# Patient Record
Sex: MALE | Race: OTHER | HISPANIC OR LATINO | URBAN - METROPOLITAN AREA
[De-identification: names, ages, dates, MRNs, and addresses within clinical notes are randomized per-mention and may not be internally consistent; named-entity substitution may affect disease eponyms.]

---

## 2018-12-27 ENCOUNTER — EMERGENCY (EMERGENCY)
Facility: HOSPITAL | Age: 38
LOS: 1 days | Discharge: HOME | End: 2018-12-27
Admitting: EMERGENCY MEDICINE
Payer: COMMERCIAL

## 2018-12-27 VITALS
OXYGEN SATURATION: 98 % | SYSTOLIC BLOOD PRESSURE: 144 MMHG | DIASTOLIC BLOOD PRESSURE: 106 MMHG | RESPIRATION RATE: 16 BRPM | WEIGHT: 195.11 LBS | TEMPERATURE: 98 F | HEART RATE: 84 BPM
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
## 2018-12-27 DIAGNOSIS — S46.112A STRAIN OF MUSCLE, FASCIA AND TENDON OF LONG HEAD OF BICEPS, LEFT ARM, INITIAL ENCOUNTER: ICD-10-CM

## 2018-12-27 DIAGNOSIS — M79.602 PAIN IN LEFT ARM: ICD-10-CM
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
## 2018-12-27 PROCEDURE — 73060 X-RAY EXAM OF HUMERUS: CPT | Mod: 26,LT

## 2018-12-27 PROCEDURE — 73060 X-RAY EXAM OF HUMERUS: CPT

## 2018-12-27 PROCEDURE — 99283 EMERGENCY DEPT VISIT LOW MDM: CPT

## 2018-12-27 PROCEDURE — 73030 X-RAY EXAM OF SHOULDER: CPT

## 2018-12-27 PROCEDURE — 99284 EMERGENCY DEPT VISIT MOD MDM: CPT

## 2018-12-27 PROCEDURE — 73030 X-RAY EXAM OF SHOULDER: CPT | Mod: 26

## 2018-12-27 RX ORDER — OXYCODONE AND ACETAMINOPHEN 5; 325 MG/1; MG/1
1 TABLET ORAL ONCE
Qty: 0 | Refills: 0 | Status: DISCONTINUED | OUTPATIENT
Start: 2018-12-27 | End: 2018-12-27

## 2018-12-27 RX ADMIN — OXYCODONE AND ACETAMINOPHEN 1 TABLET(S): 5; 325 TABLET ORAL at 18:14

## 2018-12-27 RX ADMIN — OXYCODONE AND ACETAMINOPHEN 1 TABLET(S): 5; 325 TABLET ORAL at 17:22

## 2018-12-27 NOTE — ED PROVIDER NOTE - CARE PROVIDER_API CALL
Moe Woods), Orthopaedic Surgery  [address]  [address] [place] [address]  Phone: [phone]  Fax: [phone]

## 2018-12-27 NOTE — ED PROVIDER NOTE - NSFOLLOWUPINSTRUCTIONS_ED_ALL_ED_FT
PLEASE KEEP YOUR ARM IN A SLING AND FOLLOW-UP WITH DR. HANSEN. Return to the Emergency Department if you have any new or worsening symptoms, or if you have any concerns.    Biceps Tendon Disruption (Proximal)  ImageThe proximal biceps tendon is a strong cord of tissue that connects the biceps muscle, on the front of the upper arm, to the shoulder blade. A proximal biceps tendon disruption can include a partial or complete tear of the tendon near where it connects to the bone near the shoulder. A proximal biceps tendon disruption can interfere with the ability to lift the arm in front of the body, stabilize the shoulder, bend the elbow, and turn the hand palm-up (supination).    What are the causes?  A biceps tendon disruption happens when the tendon is exposed to too much force. This excess force may be caused by:    The elbow being suddenly straightened from a bent position because of an external force. This could happen, for example, while catching a heavy weight or being pulled when waterskiing.  Wear and tear from physical activity.  Breaking a fall with your hand.    What increases the risk?  The following factors may make you more likely to develop this condition:    Playing contact sports.  Doing activities or sports that involve throwing or overhead movements, such as racket sports, gymnastics, or baseball.  Doing activities or sports that involve putting sudden force on the arm, such as weightlifting or waterskiing.  Having a weakened tendon. The tendon may be weak because of:    Long-lasting (chronic) biceps tendinitis.  Certain medical conditions, such as diabetes or rheumatoid arthritis.  Repeated corticosteroid use.  Repetitive overhead movements.      What are the signs or symptoms?  Symptoms of this condition may include:    Sudden sharp pain in the front of the shoulder. Pain may get worse during certain movements, such as:    Lifting or carrying objects.  Straightening the elbow.  Throwing or using overhead movements.    Inflammation or a feeling of unusual warmth on the front of the shoulder.  Painful tightening (spasm) of the biceps muscle.  A bulge on the inside of the upper arm when the elbow is bent.  Bruising in the shoulder or upper arm. This may develop 24–48 hours after the tendon is injured.  Limited range of motion of the shoulder and elbow.  Weakness in the elbow and forearm when:    Bending the elbow.  Rotating the wrist.      How is this diagnosed?  This condition is diagnosed based on your symptoms, your medical history, and a physical exam. Your health care provider may test the strength and range of motion of your shoulder and elbow. You may have imaging tests, such as X-rays, MRI, or ultrasound.    How is this treated?  This condition is treated by resting and icing the injured area, and by doing physical therapy exercises. Depending on the severity of your condition, treatment may also include:    Medicines to help relieve pain and inflammation.  Avoiding certain activities that put stress on your shoulder.  One or more injections of medicines (corticosteroids) into your upper arm to help reduce inflammation (rare).  Surgery to repair the tear. This may be needed if nonsurgical treatments do not improve your condition.    Follow these instructions at home:  Managing pain, stiffness, and swelling     If directed, put ice on the injured area:    Put ice in a plastic bag.  Place a towel between your skin and the bag.  Leave the ice on for 20 minutes, 2–3 times a day.    Move your fingers often to avoid stiffness and to lessen swelling.  ImageRaise (elevate) the injured area while you are sitting or lying down.  Activity     Return to your normal activities as told by your health care provider. Ask your health care provider what activities are safe for you.  Avoid activities that cause pain or make your condition worse.  Do not lift anything that is heavier than 10 lb (4.5 kg) until your health care provider approves.  Do exercises as told by your health care provider.  General instructions     Take over-the-counter and prescription medicines only as told by your health care provider.  Do not drive or operate heavy machinery while taking prescription pain medicines.  Keep all follow-up visits as told by your health care provider. This is important.  How is this prevented?  Warm up and stretch before being active.  Cool down and stretch after being active.  Give your body time to rest between periods of activity.  Make sure to use equipment that fits you.  Be safe and responsible while being active to avoid falls.  Maintain physical fitness, including strength and flexibility.  Contact a health care provider if:  You have symptoms that get worse or do not get better after 2 weeks of treatment.  You develop new symptoms.  Get help right away if:  You have severe pain.  You develop pain or numbness in your hand.  Your hand feels unusually cold.  Your fingernails turn a dark color, such as blue or gray.  This information is not intended to replace advice given to you by your health care provider. Make sure you discuss any questions you have with your health care provider.

## 2018-12-27 NOTE — ED PROVIDER NOTE - OBJECTIVE STATEMENT
37 y/o male with a PMHx of recent shoulder surgery (repair of labrum and repair of teres minor and supraspinatus) is present with left shoulder pain. Pt reports he was working out at the gym lifting weight when he felt sudden pain located on his left arm. Pain is sharp, constant and radiates distally to his elbow. His pain is associated with numbness/tingling of the distal fingers tips. Pain is severe, pt is unable to lift his arm above his head. He denies the following: redness, swelling, skin breaks.

## 2018-12-27 NOTE — CONSULT NOTE ADULT - SUBJECTIVE AND OBJECTIVE BOX
Pt Name: JORDAN MUJICA  MRN: [id number]    38M with recent L shoulder cuff repair, labral repair, and biceps tendon repair 7 weeks ago who presents with L arm pain after he heard a popping sound while grappling in [employer]. Patient denies any other trauma or fall. States that he was cleared by therapy to go back to exercising but felt immediate pain after trying a grappling manuever. Also complains of numbness in the volar aspect of left hand, denies weakness.     ROS is otherwise negative.  PAST MEDICAL & SURGICAL HISTORY:      Allergies: NKDA    Medications:      Social History:  Ambulation: Walking independently    PHYSICAL EXAM:    T(C): 36.8 (12-27-18 @ 16:16), Max: 36.8 (12-27-18 @ 16:16)  HR: 84 (12-27-18 @ 16:16) (84 - 84)  BP: 144/106 (12-27-18 @ 16:16) (144/106 - 144/106)  RR: 16 (12-27-18 @ 16:16) (16 - 16)  SpO2: 98% (12-27-18 @ 16:16) (98% - 98%)  Wt(kg): --    Gen: well developed, well nourished, comfortable  Affected extremity: LUE  no open wounds or lacerations  surgical incisions well healed  ericka sign present  weakness on supination and flexion of L forearm  SILT axillary/musc/radial/median/ulnar  firing AIN/PIN/ulnar/deltoid/triceps/biceps (weak)      wwp <2 sec cap refill  FF limited to 70 degrees secondary to pain  ER to 45 degrees bilaterally     Labs:    XR negative for acute fx or dislocation        A/P  Pt is a 37yo Male s/p L shoulder proximal biceps tear s/p recent repair of biceps tendon, labrum, and rotator cuff  -NWB LUE in sling for comfort  -Pain control  -Return to ED for fever (T>101.5F), bleeding, numbness, intractable pain     -f/u with Dr. Woods in office  d/w attending on call Dr. Woods

## 2018-12-27 NOTE — ED ADULT TRIAGE NOTE - CHIEF COMPLAINT QUOTE
Pt w kristen of shoulder surgery w Dr KELI Woods in late October presents c/o LT arm pain, unable to lift it and reports numbness in the fingers after he thinks he injured it during a training session today.

## 2018-12-27 NOTE — ED ADULT NURSE NOTE - OBJECTIVE STATEMENT
39 y/o M c/o L arm injury today while working out. Pt states he heard a "pop" in his bicep. Pt medicated per order and L arm placed in sling. Pt to xray

## 2018-12-27 NOTE — ED PROVIDER NOTE - MEDICAL DECISION MAKING DETAILS
pt with left arm pain s/p weight lifting and grappling. obvious palpable mass. sensation and motor function intact. no rom due to pain. pain treated. xray negative. ortho consulted. confirmed bicep tendon rupture. sling and fu with dr. purdy.
